# Patient Record
Sex: MALE
[De-identification: names, ages, dates, MRNs, and addresses within clinical notes are randomized per-mention and may not be internally consistent; named-entity substitution may affect disease eponyms.]

---

## 2018-07-02 ENCOUNTER — HOSPITAL ENCOUNTER (OUTPATIENT)
Dept: HOSPITAL 5 - OR | Age: 53
Discharge: HOME | End: 2018-07-02
Attending: ORTHOPAEDIC SURGERY
Payer: COMMERCIAL

## 2018-07-02 VITALS — SYSTOLIC BLOOD PRESSURE: 123 MMHG | DIASTOLIC BLOOD PRESSURE: 84 MMHG
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
## 2018-07-02 DIAGNOSIS — I10: ICD-10-CM

## 2018-07-02 DIAGNOSIS — E03.9: ICD-10-CM

## 2018-07-02 DIAGNOSIS — M75.51: ICD-10-CM

## 2018-07-02 DIAGNOSIS — M19.011: ICD-10-CM
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
## 2018-07-02 DIAGNOSIS — S46.811A: ICD-10-CM
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
## 2018-07-02 DIAGNOSIS — S46.011A: Primary | ICD-10-CM

## 2018-07-02 DIAGNOSIS — Z79.899: ICD-10-CM
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
## 2018-07-02 DIAGNOSIS — Z87.891: ICD-10-CM

## 2018-07-02 PROCEDURE — 29824 SHO ARTHRS SRG DSTL CLAVICLC: CPT

## 2018-07-02 PROCEDURE — C1713 ANCHOR/SCREW BN/BN,TIS/BN: HCPCS

## 2018-07-02 PROCEDURE — 29826 SHO ARTHRS SRG DECOMPRESSION: CPT

## 2018-07-02 PROCEDURE — L1830 KO IMMOB CANVAS LONG PRE OTS: HCPCS

## 2018-07-02 PROCEDURE — 29827 SHO ARTHRS SRG RT8TR CUF RPR: CPT

## 2018-07-02 PROCEDURE — 82962 GLUCOSE BLOOD TEST: CPT

## 2018-07-02 RX ADMIN — HYDROMORPHONE HYDROCHLORIDE PRN MG: 1 INJECTION, SOLUTION INTRAMUSCULAR; INTRAVENOUS; SUBCUTANEOUS at 10:05

## 2018-07-02 RX ADMIN — HYDROMORPHONE HYDROCHLORIDE PRN MG: 1 INJECTION, SOLUTION INTRAMUSCULAR; INTRAVENOUS; SUBCUTANEOUS at 10:16

## 2018-07-02 NOTE — SHORT STAY SUMMARY
Short Stay Documentation


Date of service: 07/02/18





- History


H&P: obtained from office





- Allergies and Medications


Current Medications: 


 Allergies





No Known Allergies Allergy (Verified 06/11/18 15:08)


 





 Home Medications











 Medication  Instructions  Recorded  Confirmed  Last Taken  Type


 


AtorvaSTATin [Lipitor] 10 mg PO QHS 06/11/18 06/11/18 Unknown History


 


Levothyroxine [Synthroid] 25 mcg PO QAM 06/11/18 06/11/18 Unknown History


 


Lisinopril/Hydrochlorothiazide 1 each PO DAILY 06/11/18 06/11/18 Unknown History





[Zestoretic 10-12.5 mg Tablet]     


 


metFORMIN [Glucophage] 500 mg PO BID 06/11/18 06/11/18 Unknown History








Active Medications





Cefazolin Sodium (Ancef/Sterile Water 2 Gm/20 Ml)  2 gm IV PREOP NR


   Stop: 07/02/18 23:00


Celecoxib (Celebrex)  200 mg PO PREOP NR


   Stop: 07/02/18 12:30


   Last Admin: 07/02/18 07:37 Dose:  200 mg


Gabapentin (Neurontin)  300 mg PO PREOP NR


   Stop: 07/02/18 12:30


   Last Admin: 07/02/18 07:37 Dose:  300 mg


Hydromorphone HCl (Dilaudid)  0.5 mg IV Q10MIN PRN


   PRN Reason: Pain , Severe (7-10)


   Stop: 07/02/18 12:30


Lactated Ringer's (Lactated Ringers)  1,000 mls @ 100 mls/hr IV AS DIRECT RAIZA


   Last Admin: 07/02/18 07:39 Dose:  100 mls/hr


Midazolam HCl (Versed)  2 mg IV PREOP NR


   Stop: 07/02/18 12:30


Ondansetron HCl (Zofran)  4 mg IV ONCE PRN


   PRN Reason: Nausea And Vomiting


   Stop: 07/02/18 12:30











- Brief post op/procedure progress note


Date of procedure: 07/02/18


Pre-op diagnosis: persistent right shoulder pain, AC joint arthritis, rotator 

cuff tear


Post-op diagnosis: other (persistent right shoulder pain, acromioclavicluar 

joint arthritis, recurrent rotator cuff tear, adhesions both intraarticular and 

subacromial, extensive subacromial bursitis)


Procedure: 


right shoulder arthroscopy, subacromial decompression, distal clavicle excision

, debriedement of extensive subacromial bursitis, debriedement of copious 

adhesions both intraarticular and subacromial space, rotator cuff repair





Anesthesia: GETA


Findings: 


as above





Surgeon: BHAVIK MALDONADO


Estimated blood loss: none


Pathology: none


Condition: stable





- Hospital course


Hospital course: 


no perioperative complications








- Disposition


Condition at discharge: Good


Disposition: DC-01 TO HOME OR SELFCARE





Short Stay Discharge Plan


Follow up with: 


GUERA MCDONALD [Other] - 7 Days

## 2018-07-02 NOTE — OPERATIVE REPORT
PREOPERATIVE DIAGNOSES:  Persistent right shoulder pain, acromioclavicular joint

arthritis, recurrent rotator cuff tear.



POSTOPERATIVE DIAGNOSES:  Persistent right shoulder pain, advanced

acromioclavicular joint arthritic change with type 2 acromion causing severe

impingement upon the rotator cuff, recurrent rotator cuff tear involving the

supraspinatus tendon, extensive subacromial bursitis, copious adhesions located

diffusely throughout the intraarticular as well as subacromial space.



OPERATIVE PROCEDURE:  Right shoulder arthroscopy, subacromial decompression,

distal clavicle excision, debridement of extensive subacromial bursitis,

debridement of copious adhesions located diffusely throughout the intraarticular

and subacromial space followed by revision rotator cuff repair.



SURGEON:  Simeon Lemon MD.



ASSISTANT:  None.



ANESTHESIA:  General plus interscalene block to the operative right upper

extremity.



PREOPERATIVE ANTIBIOTICS:  Ancef 2 grams IV within 1 hour of skin incision.



DVT PROPHYLAXIS:  Open toe, thigh high compression stockings and SCD pumps to

bilateral lower extremities.



OPERATIVE INSTRUMENTATION:  One metallic Opus rotator cuff anchor with

corresponding #2 Cobraided sutures.



OPERATIVE COMPLICATIONS:  None.



OPERATIVE HISTORY AND PHYSICAL:  This is a 53-year-old male, who injured his

right shoulder at work approximately 6 months ago.  The patient continues to

have persistent pain and discomfort and marked limitation with normal activities

of daily living, which has failed to improve despite extensive nonoperative

treatment.  MRI scan was performed, which was positive for acromioclavicular

joint arthritis as well as recurrent rotator cuff tear.  The patient's MRI

findings and diagnosis were discussed at length.  After making sure the patient

understood the diagnosis all his questions were answered.  We then discussed

treatment alternatives of surgical and nonsurgical including risks and benefits

of both.  After a long lengthy discussion, the patient opted to proceed with

operative intervention.  This will entail a right shoulder arthroscopy,

subacromial decompression, clavicle excision, revision of rotator cuff repair

and surgery as indicated.  The risks of which were discussed to include, but not

exclusive of infection, blood loss, nerve damage, loss of range of motion,

persistent pain.  Again, the patient understood, all of his questions answered,

he wished to proceed with operative intervention.



OPERATIVE PROCEDURE:  The patient was seen in the preoperative holding room area

at which point informed consent was reviewed and appropriate right upper

extremity was identified and then marked.  Anesthesia then performed an

interscalene block the right upper extremity.  After confirmation of adequate

anesthesia of the right upper extremity, the patient was then brought back to

the operating room and placed supine on standard operating room table with the

beach chair positioner already in place.  General anesthesia was then

administered and an endotracheal tube was inserted.  After confirmation of

appropriate general anesthesia and checking appropriate placement on the

endotracheal tube.  We then made sure that all bony prominences were well

padded.  There were no wrinkles in the compression stockings on bilateral lower

extremities and SCD pumps were applied to bilateral lower extremities.  A pillow

was placed beneath the posterior aspect of bilateral thigh was placed in slight

flexion of the hips and knees because of the popliteal fossa was free and clear.

 The patient was then sat up in the beach chair position using the beach chair

position, which was already in place.  His head was secured in nice neutral

position.  The well left arm was secured in neutral position on the patient's

side with the aid of the well arm valdez.  The right upper extremity was then

examined under anesthesia.  The patient was seen to have full range of motion

and there was no evidence of instability.  On examination under anesthesia, the

right upper extremity was then prepped and draped in the usual sterile fashion. 

After prepping and draping, a timeout was called and the right upper extremity

was identified, which again had been marked in the preoperative holding area. 

We began the procedure by first using a #15-blade after skin incision and the

blunt trocar was inserted into intra-articular aspect of the glenohumeral joint.

 This went without difficulty or damage to articular cartilage.  Once in place,

the arthroscopic camera needed to be placed in the anterior aspect of the

shoulder and established an anterior portal by first inserting 18 gauge spinal

needle in the subscap and biceps tendons under direct arthroscopic

visualization.  Once confirmed to be in appropriate position, a 15-blade was

then used to establish anteromedial portal.  Once the portals were established

the blunt trocar was inserted widened portal site.  Following the arthroscopic

probe, we began at arthroscopy.  The anterior aspect of the shoulder and the

patient had partial thickness tear of the subscapularis and encompassing well

less than 10% with the tendons gently debrided using a 4.0 meniscal shaver. 

Middle glenohumeral ligament was seen to be intact.  There were no tears in the

anterior superior or posterior labrum.  There was copious amounts of adhesions

located throughout the area of the anterior labrum as well as within the rotator

cuff and interval, which was gently debrided using a 4.0 meniscal shaver. 

Hemostasis was achieved with the Arthrocare ablation wand.  There was grade 1

articular cartilage loss of the glenohumeral joint.  There was a normal bare

area of the Hill-Sachs lesion.  There were no loose bodies in the axillary

recess.  Inspection of the biceps tendon showed to be intact.  Next, the

shoulder was in its normal relation.  Inspection of the rotator cuff showed to

be full thickness tearing of the supraspinatus tendon.  There is a negative

drive-through sign.  Normal bare area without Hill-Sachs lesion.  Arthroscopic

pump was then turned off.  There was good hemostasis.  Once this was confirmed,

the extraneous fluid was suctioned from the glenohumeral joint using

arthroscopic cannula.  Following this, arthroscopic instrumentation was removed

from the glenohumeral joint then placed in the subacromial space and the

subacromial space saw there was severe extensive subacromial bursitis.  A third

incision made in the lateral aspect of the shoulder along the distal clavicle

away from the axillary nerve.  Once the incision was made, the extensive

subacromial bursitis was debrided using a 4.0 meniscal shaver.  Hemostasis was

achieved with Arthrocare ablation wand.  Following this, the arm was placed to

full range of motion.  Direct arthroscopic visualization saw there was severe

impingement of the rotator cuff undersurface of the acromion and the distal

clavicle.  The arthroscopic cannula were then placed in subacromial space and

also in the subacromial space saw there was extensive subacromial bursitis. 

This was gently debrided using a 4.0 meniscal shaver and hemostasis was achieved

with Arthrocare ablation wand.  Once completed and was placed a full range of

motion was tolerated severe impingement with rotator cuff undersurface of the

acromion and the distal clavicle.  The soft tissue was removed from the

undersurface of the acromion using Arthrocare ablation wand and then using the

4.0 hooded barrel bur.  Subacromial decompression in standard fashion with an

inferior, superior, posterior, anterior making sure not to leave any residual

anterior hook.  Turned our attention to the distal clavicle, which was also seen

to be arthritic and contributing to impingement.  The distal clavicle excision

was carried out using 4.0 hooded barrel bur to a depth of approximately 6-8 mm. 

Once completed, the arm was again placed through a full range of motion.  There

was no further impingement.  The rotator cuff undersurface of the acromion and

distal clavicle.  Following this, we turned our attention to the rotator cuff

itself, which was seen to be a recurrent full thickness tear involving the

supraspinatus tendon.  The atrophic edge, which was debrided using a series of

basket punches and 4.0 meniscal shaver down to a nice smooth stable healthy

remaining tissue.  The soft tissue was removed from the rotator cuff footprint

of the humeral head.  Then, using the Olympus suture passer, we passed #2 Quill

braided suture in a horizontal mattress type fashion and then tapped by hand and

then inserted one metallic clip was rotator cuff anchor with the #2 Coblator

suture.  Anatomical repair and rotator cuff back down to the rotator cuff

footprint.  Once repair was made, the repair was probed and seen to be stable

and was placed in full range of motion was good.  Excellent stability of the

repair without any further impingement.  The rotator cuff undersurface of the

acromion and distal clavicle.  When the arthroscopic pump was turned off to make

sure there was good hemostasis.  Once this was confirmed excellent suction of

the subacromial space using arthroscopic cannula as well as arthroscopic

instrumentation was removed.  The 3 portal sites were closed with 3-0 nylon in

simple fashion.  Adaptic, 4 x 4s, ABD, Medipore dressing, small abduction sling

and Cryo/Cuff blanket was applied.  The patient was sat down the Beach in a

supine position, was from general anesthesia without complications to the

recovery room in stable condition.  Standard postoperative orders were written.





DD: 07/02/2018 09:47

DT: 07/02/2018 10:48

JOB# [id number]  [id number]

ANJALI/CROW

## 2018-07-02 NOTE — ANESTHESIA CONSULTATION
Anesthesia Consult and Med Hx


Date of service: 07/02/18





- Airway


Anesthetic Teeth Evaluation: Good


ROM Head & Neck: Adequate


Mental/Hyoid Distance: Adequate


Mallampati Class: Class II


Intubation Access Assessment: Probably Good





- Pulmonary Exam


CTA: Yes





- Cardiac Exam


Cardiac Exam: RRR





- Pre-Operative Health Status


ASA Pre-Surgery Classification: ASA2


Proposed Anesthetic Plan: General





- Pulmonary


Hx Smoking: Yes (STOPPED 2006 , 1 PPD X 15 YRS)


Hx Sleep Apnea: No (JNANET PRE SCREEN HIGH RISK)





- Cardiovascular System


Hx Hypertension: Yes (X 1 YR)





- Endocrine


Hx Hypothyroidism: Yes





- Other Systems


Hx Cancer: No